# Patient Record
Sex: FEMALE | Race: OTHER | Employment: UNEMPLOYED | URBAN - METROPOLITAN AREA
[De-identification: names, ages, dates, MRNs, and addresses within clinical notes are randomized per-mention and may not be internally consistent; named-entity substitution may affect disease eponyms.]

---

## 2019-10-22 ENCOUNTER — HOSPITAL ENCOUNTER (EMERGENCY)
Age: 34
Discharge: HOME | End: 2019-10-23
Attending: EMERGENCY MEDICINE
Payer: SELF-PAY

## 2019-10-22 DIAGNOSIS — R10.13 ABDOMINAL PAIN, EPIGASTRIC: Primary | ICD-10-CM

## 2019-10-22 LAB
APPEARANCE UR: CLEAR
BACTERIA URNS QL MICRO: NEGATIVE /HPF
BASOPHILS # BLD: 0 K/UL (ref 0–0.1)
BASOPHILS NFR BLD: 0 % (ref 0–1)
BILIRUB UR QL: NEGATIVE
COLOR UR: ABNORMAL
DIFFERENTIAL METHOD BLD: NORMAL
EOSINOPHIL # BLD: 0.1 K/UL (ref 0–0.4)
EOSINOPHIL NFR BLD: 2 % (ref 0–7)
EPITH CASTS URNS QL MICRO: ABNORMAL /LPF
ERYTHROCYTE [DISTWIDTH] IN BLOOD BY AUTOMATED COUNT: 13 % (ref 11.5–14.5)
GLUCOSE UR STRIP.AUTO-MCNC: NEGATIVE MG/DL
HCG UR QL: NEGATIVE
HCT VFR BLD AUTO: 42.7 % (ref 35–47)
HGB BLD-MCNC: 14.3 G/DL (ref 11.5–16)
HGB UR QL STRIP: ABNORMAL
HYALINE CASTS URNS QL MICRO: ABNORMAL /LPF (ref 0–5)
IMM GRANULOCYTES # BLD AUTO: 0 K/UL (ref 0–0.04)
IMM GRANULOCYTES NFR BLD AUTO: 0 % (ref 0–0.5)
KETONES UR QL STRIP.AUTO: NEGATIVE MG/DL
LEUKOCYTE ESTERASE UR QL STRIP.AUTO: NEGATIVE
LYMPHOCYTES # BLD: 2.1 K/UL (ref 0.8–3.5)
LYMPHOCYTES NFR BLD: 34 % (ref 12–49)
MCH RBC QN AUTO: 29.2 PG (ref 26–34)
MCHC RBC AUTO-ENTMCNC: 33.5 G/DL (ref 30–36.5)
MCV RBC AUTO: 87.3 FL (ref 80–99)
MONOCYTES # BLD: 0.5 K/UL (ref 0–1)
MONOCYTES NFR BLD: 8 % (ref 5–13)
NEUTS SEG # BLD: 3.5 K/UL (ref 1.8–8)
NEUTS SEG NFR BLD: 56 % (ref 32–75)
NITRITE UR QL STRIP.AUTO: NEGATIVE
NRBC # BLD: 0 K/UL (ref 0–0.01)
NRBC BLD-RTO: 0 PER 100 WBC
PH UR STRIP: 7 [PH] (ref 5–8)
PLATELET # BLD AUTO: 259 K/UL (ref 150–400)
PMV BLD AUTO: 10.7 FL (ref 8.9–12.9)
PROT UR STRIP-MCNC: NEGATIVE MG/DL
RBC # BLD AUTO: 4.89 M/UL (ref 3.8–5.2)
RBC #/AREA URNS HPF: ABNORMAL /HPF (ref 0–5)
SP GR UR REFRACTOMETRY: 1.03 (ref 1–1.03)
UROBILINOGEN UR QL STRIP.AUTO: 1 EU/DL (ref 0.2–1)
WBC # BLD AUTO: 6.3 K/UL (ref 3.6–11)
WBC URNS QL MICRO: ABNORMAL /HPF (ref 0–4)

## 2019-10-22 PROCEDURE — 96374 THER/PROPH/DIAG INJ IV PUSH: CPT

## 2019-10-22 PROCEDURE — 99284 EMERGENCY DEPT VISIT MOD MDM: CPT

## 2019-10-22 PROCEDURE — 80053 COMPREHEN METABOLIC PANEL: CPT

## 2019-10-22 PROCEDURE — 81025 URINE PREGNANCY TEST: CPT

## 2019-10-22 PROCEDURE — 81001 URINALYSIS AUTO W/SCOPE: CPT

## 2019-10-22 PROCEDURE — 85025 COMPLETE CBC W/AUTO DIFF WBC: CPT

## 2019-10-22 PROCEDURE — 74011250636 HC RX REV CODE- 250/636: Performed by: PHYSICIAN ASSISTANT

## 2019-10-22 PROCEDURE — 36415 COLL VENOUS BLD VENIPUNCTURE: CPT

## 2019-10-22 PROCEDURE — 83690 ASSAY OF LIPASE: CPT

## 2019-10-22 PROCEDURE — 96375 TX/PRO/DX INJ NEW DRUG ADDON: CPT

## 2019-10-22 RX ORDER — ONDANSETRON 2 MG/ML
4 INJECTION INTRAMUSCULAR; INTRAVENOUS
Status: COMPLETED | OUTPATIENT
Start: 2019-10-22 | End: 2019-10-22

## 2019-10-22 RX ORDER — FAMOTIDINE 10 MG/ML
20 INJECTION INTRAVENOUS
Status: COMPLETED | OUTPATIENT
Start: 2019-10-22 | End: 2019-10-22

## 2019-10-22 RX ADMIN — FAMOTIDINE 20 MG: 10 INJECTION, SOLUTION INTRAVENOUS at 23:41

## 2019-10-22 RX ADMIN — ONDANSETRON 4 MG: 2 INJECTION INTRAMUSCULAR; INTRAVENOUS at 23:41

## 2019-10-23 ENCOUNTER — APPOINTMENT (OUTPATIENT)
Dept: CT IMAGING | Age: 34
End: 2019-10-23
Attending: PHYSICIAN ASSISTANT
Payer: SELF-PAY

## 2019-10-23 VITALS
OXYGEN SATURATION: 100 % | SYSTOLIC BLOOD PRESSURE: 125 MMHG | HEART RATE: 79 BPM | DIASTOLIC BLOOD PRESSURE: 89 MMHG | RESPIRATION RATE: 17 BRPM | WEIGHT: 157 LBS | HEIGHT: 62 IN | BODY MASS INDEX: 28.89 KG/M2 | TEMPERATURE: 98.7 F

## 2019-10-23 LAB
ALBUMIN SERPL-MCNC: 3.6 G/DL (ref 3.5–5)
ALBUMIN/GLOB SERPL: 0.8 {RATIO} (ref 1.1–2.2)
ALP SERPL-CCNC: 85 U/L (ref 45–117)
ALT SERPL-CCNC: 38 U/L (ref 12–78)
ANION GAP SERPL CALC-SCNC: 6 MMOL/L (ref 5–15)
AST SERPL-CCNC: 23 U/L (ref 15–37)
BILIRUB SERPL-MCNC: 0.3 MG/DL (ref 0.2–1)
BUN SERPL-MCNC: 12 MG/DL (ref 6–20)
BUN/CREAT SERPL: 14 (ref 12–20)
CALCIUM SERPL-MCNC: 8.7 MG/DL (ref 8.5–10.1)
CHLORIDE SERPL-SCNC: 107 MMOL/L (ref 97–108)
CO2 SERPL-SCNC: 27 MMOL/L (ref 21–32)
CREAT SERPL-MCNC: 0.88 MG/DL (ref 0.55–1.02)
GLOBULIN SER CALC-MCNC: 4.4 G/DL (ref 2–4)
GLUCOSE SERPL-MCNC: 114 MG/DL (ref 65–100)
LIPASE SERPL-CCNC: 228 U/L (ref 73–393)
POTASSIUM SERPL-SCNC: 3.5 MMOL/L (ref 3.5–5.1)
PROT SERPL-MCNC: 8 G/DL (ref 6.4–8.2)
SODIUM SERPL-SCNC: 140 MMOL/L (ref 136–145)

## 2019-10-23 PROCEDURE — 74011000250 HC RX REV CODE- 250: Performed by: EMERGENCY MEDICINE

## 2019-10-23 PROCEDURE — 74177 CT ABD & PELVIS W/CONTRAST: CPT

## 2019-10-23 PROCEDURE — 74011636320 HC RX REV CODE- 636/320: Performed by: RADIOLOGY

## 2019-10-23 PROCEDURE — 74011250637 HC RX REV CODE- 250/637: Performed by: EMERGENCY MEDICINE

## 2019-10-23 RX ORDER — FAMOTIDINE 20 MG/1
20 TABLET, FILM COATED ORAL 2 TIMES DAILY
Qty: 20 TAB | Refills: 0 | Status: SHIPPED | OUTPATIENT
Start: 2019-10-23 | End: 2019-11-02

## 2019-10-23 RX ORDER — METOCLOPRAMIDE 10 MG/1
10 TABLET ORAL
Status: COMPLETED | OUTPATIENT
Start: 2019-10-23 | End: 2019-10-23

## 2019-10-23 RX ORDER — METOCLOPRAMIDE 5 MG/1
5 TABLET ORAL
Qty: 30 TAB | Refills: 0 | Status: SHIPPED | OUTPATIENT
Start: 2019-10-23 | End: 2019-11-02

## 2019-10-23 RX ADMIN — LIDOCAINE HYDROCHLORIDE 40 ML: 20 SOLUTION ORAL; TOPICAL at 01:43

## 2019-10-23 RX ADMIN — METOCLOPRAMIDE HYDROCHLORIDE 10 MG: 10 TABLET ORAL at 01:43

## 2019-10-23 RX ADMIN — IOPAMIDOL 100 ML: 755 INJECTION, SOLUTION INTRAVENOUS at 00:51

## 2019-10-23 NOTE — ED PROVIDER NOTES
Miko Hooks is a 35 y.o. female  who presents by private vehicle to ER with c/o Patient presents with:  Epigastric Pain  Abdominal Pain  Patient presents with complaints of epigastric abdominal pain. Patient reports associated nausea. Denies vomiting or diarrhea. Patient reports symptoms have been going on for \"awhile\" however come and go and are worse tonight. Patient denies any significant medical history. Denies any previous surgeries. She specifically denies any fevers, chills,  vomiting, chest pain, shortness of breath, headache, rash, diarrhea, urinary/bowel changes, sweating or weight loss. PCP: No primary care provider on file. PMHx significant for: No past medical history on file. PSHx significant for: No past surgical history on file. Social Hx: Tobacco use: Social History    Tobacco Use      Smoking status: Not on file  ; EtOH use: The patient states she drinks 0 per week.; Illicit Drug use: Allergies:  No Known Allergies    There are no other complaints, changes or physical findings at this time. No past medical history on file. No past surgical history on file. No family history on file.     Social History     Socioeconomic History    Marital status: Not on file     Spouse name: Not on file    Number of children: Not on file    Years of education: Not on file    Highest education level: Not on file   Occupational History    Not on file   Social Needs    Financial resource strain: Not on file    Food insecurity:     Worry: Not on file     Inability: Not on file    Transportation needs:     Medical: Not on file     Non-medical: Not on file   Tobacco Use    Smoking status: Not on file   Substance and Sexual Activity    Alcohol use: Not on file    Drug use: Not on file    Sexual activity: Not on file   Lifestyle    Physical activity:     Days per week: Not on file     Minutes per session: Not on file    Stress: Not on file   Relationships    Social connections:     Talks on phone: Not on file     Gets together: Not on file     Attends Zoroastrianism service: Not on file     Active member of club or organization: Not on file     Attends meetings of clubs or organizations: Not on file     Relationship status: Not on file    Intimate partner violence:     Fear of current or ex partner: Not on file     Emotionally abused: Not on file     Physically abused: Not on file     Forced sexual activity: Not on file   Other Topics Concern    Not on file   Social History Narrative    Not on file         ALLERGIES: Patient has no known allergies. Review of Systems   Constitutional: Negative for activity change, chills and fever. HENT: Negative for congestion, rhinorrhea and sore throat. Respiratory: Negative for shortness of breath. Cardiovascular: Negative for chest pain and leg swelling. Gastrointestinal: Positive for abdominal pain and nausea. Negative for diarrhea and vomiting. Genitourinary: Negative for dysuria, vaginal bleeding and vaginal discharge. Musculoskeletal: Negative for arthralgias and myalgias. Neurological: Negative for dizziness. Psychiatric/Behavioral: Negative for confusion. All other systems reviewed and are negative. Vitals:    10/22/19 2320   BP: 139/88   Pulse: 81   Resp: 19   Temp: 98.6 °F (37 °C)   SpO2: 100%   Weight: 71.2 kg (157 lb)   Height: 5' 2\" (1.575 m)            Physical Exam   Constitutional: She is oriented to person, place, and time. She appears well-developed. HENT:   Head: Normocephalic and atraumatic. Right Ear: External ear normal.   Left Ear: External ear normal.   Nose: Nose normal.   Mouth/Throat: Oropharynx is clear and moist. No oropharyngeal exudate. Eyes: Conjunctivae, EOM and lids are normal. Right eye exhibits no discharge. Left eye exhibits no discharge. Neck: Normal range of motion. No tracheal deviation present. No thyromegaly present.    Cardiovascular: Normal rate, regular rhythm, normal heart sounds and intact distal pulses. Pulmonary/Chest: Effort normal and breath sounds normal.   Abdominal: Soft. Normal appearance and bowel sounds are normal. There is tenderness in the epigastric area. There is no rigidity, no rebound, no guarding and negative Bass's sign. Non-surgical abdominal exam   Musculoskeletal: Normal range of motion. Neurological: She is alert and oriented to person, place, and time. Skin: Skin is warm and dry. Psychiatric: She has a normal mood and affect. Judgment normal.        MDM  Number of Diagnoses or Management Options  Abdominal pain, epigastric:   Diagnosis management comments: Assesment/Plan- 35 y.o. Patient presents with:  Epigastric Pain  Abdominal Pain  differential includes: pancreatitis, gallstones, cholecystitis. Labs and imaging reviewed with no acute findings. Patient is well appearing, afebrile and tolerating PO. Recommend PCP follow up. Patient educated on reasons to return to the ED.          Amount and/or Complexity of Data Reviewed  Clinical lab tests: reviewed  Tests in the radiology section of CPT®: reviewed           Procedures

## 2019-10-23 NOTE — DISCHARGE INSTRUCTIONS
Patient Education        Indigestión (Donnice [place] estomacal): Instrucciones de cuidado - [ Indigestion (Dyspepsia or Heartburn): Care Instructions ]  Instrucciones de cuidado  A veces puede ser difícil determinar la causa de la indigestión (dispepsia o acidez estomacal). En [employer], los casos de [employer] con abotagamiento, ardor, eructos y náuseas son leves y desaparecen después de varias horas. El tratamiento en el hogar y los medicamentos de venta abhinav a menudo pueden [employer] síntomas. Araceli si usted jodee algún medicamento para aliviar la indigestión sin hacer cambios en calderon Armida Rod y estilo de radha, es muy probable que vuelvan rachelle síntomas repetidamente. Si tiene indigestión con frecuencia, esto podría indicar un problema de genaro más grave. Asegúrese de hacer un seguimiento con calderon médico, el cual podría necesitar pruebas para asegurarse de cuál es la causa de calderon indigestión. La atención de seguimiento es evgeny parte clave de calderon tratamiento y seguridad. Asegúrese de hacer y acudir a todas las citas, y llame a calderon médico si está teniendo problemas. También es evgeny buena idea saber los resultados de rachelle exámenes y mantener evgeny lista de los medicamentos que jodee. ¿Cómo puede cuidarse en el [facility]? · Calderon médico podría recomendarle medicamentos de [employer]. Para la indigestión leve u ocasional podrían ayudarle los antiácidos juliano Tums, Gaviscon, Mylanta o Maalox. Tenga cuidado cuando tome medicamentos antiácidos de [employer]. Muchos de estos medicamentos contienen aspirina. Sabina la etiqueta para asegurarse de no estar tomando más de la dosis recomendada. Demasiada aspirina puede ser perjudicial.  · Calderon médico también podría recomendar reductores de ácido de venta abhinav, juliano Pepcid AC, Tagamet HB, Zantac 75 o Prilosec. Sabina y siga todas las instrucciones de la [place]. [place] estos medicamentos con frecuencia, hable con calderon médico.  · Cambie rachelle hábitos alimentarios.   ? Es mejor comer varias comidas pequeñas en lugar de dos o lavinia comidas abundantes. ? Después de comer, espere de 2 a 3 horas antes de recostarse. ? El chocolate, la menta y el alcohol pueden empeorar la GERD. ? En [employer], los [employer], los alimentos que tienen mucho ácido (juliano los tomates y las naranjas) y el café pueden empeorar los síntomas de la GERD. Si rachelle síntomas empeoran después de comer un determinado alimento, se recomienda que deje de comer shawanda alimento para joel si rachelle síntomas mejoran. · No fume ni masque tabaco. Fumar puede agravar la GERD. Si necesita ayuda para dejar de usar tabaco, hable con calderon médico sobre programas y medicamentos para dejar de usarlo. Estos pueden aumentar rachelle probabilidades de dejar el hábito para siempre. · Si tiene síntomas de GERD dolores la noche, eleve la cabecera de calderon cama entre 6 y 6 pulgadas (entre 15 y 20 cm) colocando bloques debajo del tip de la cama o evgeny cuña de espuma debajo de la cabecera del colchón. (Usar almohadas adicionales no funciona). · No use ropa que le ajuste mucho la parte media del cuerpo. · Baje de peso, si necesita hacerlo. Perder solo de 5 a 10 libras (2.3 a 4.5 kg) puede ayudarle. · No tome medicamentos antiinflamatorios juliano aspirina, ibuprofeno (Advil, Motrin) o naproxeno (Aleve). Estos medicamentos pueden irritarle el estómago. Si necesita un analgésico (medicamento para el dolor), pruebe con acetaminofén (Tylenol), que no causa malestar estomacal.  ¿Cuándo debe pedir ayuda? Llame al 911 en cualquier momento que considere que necesita atención de [place]. Por ejemplo, llame si:    · Se desmayó (perdió el conocimiento).   · Vomita aleisha o algo parecido a granos de café molido.     · Las heces son de color rojizo o muy sanguinolentas (con aleisha).   · Siente dolor o presión en el pecho. Estos síntomas podrían estar acompañados de:  ? Sudoración. ? Falta de aire. ? Náuseas o vómito.   ? Dolor que se extiende del pecho al sylvie, la mandíbula, o hacia barbara o ambos hombros o [id number] ? Sensación de [address]. ? Pulso rápido o irregular. Después de llamar al  911, mastique 1 aspirina para adultos. Espere evgeny ambulancia. No trate de conducir usted mismo un automóvil.    Llame a calderon médico ahora mismo o busque atención médica inmediata si:    · Tiene dolor abdominal intenso.     · Luz Elena heces son negruzcas y parecidas al alquitrán o tienen rastros de [place].     · Tiene dificultades para tragar.     · Pierde peso y no sabe por qué.    Preste especial atención a los cambios en calderon genaro y asegúrese de comunicarse con calderon médico si:    · No mejora juliano se esperaba. ¿Dónde puede encontrar más información en inglés? Saratha Ormond a [email or web address].info/. Ria [place] [id number] en la búsqueda para aprender más acerca de \"Indigestión (dispepsia o acidez estomacal): Instrucciones de cuidado - [ Indigestion (Dyspepsia or Heartburn): Care Instructions ]. \"  Revisado: 7 noviembre, 2018  Versión del contenido: 12.2  © 7036-2389 [employer]. Las instrucciones de cuidado fueron adaptadas bajo licencia por [employer] (which disclaims liability or warranty for this information). Si usted tiene [place] afección médica o sobre estas instrucciones, siempre pregunte a calderon profesional de genaro. [employer]wise, Incorporated niega toda garantía o responsabilidad por calderon uso de esta información.

## 2019-10-23 NOTE — ED TRIAGE NOTES
Pt. States started with pain in upper middle of abd. Pt. States is nauseated but no vomiting or diarrhea. Last BM today.